# Patient Record
Sex: FEMALE | Race: WHITE | NOT HISPANIC OR LATINO
[De-identification: names, ages, dates, MRNs, and addresses within clinical notes are randomized per-mention and may not be internally consistent; named-entity substitution may affect disease eponyms.]

---

## 2020-08-02 PROBLEM — Z00.00 ENCOUNTER FOR PREVENTIVE HEALTH EXAMINATION: Status: ACTIVE | Noted: 2020-08-02

## 2020-08-02 PROBLEM — R92.8 ABNORMAL FINDING ON BREAST IMAGING: Status: ACTIVE | Noted: 2020-08-02

## 2020-08-03 ENCOUNTER — APPOINTMENT (OUTPATIENT)
Dept: SURGICAL ONCOLOGY | Facility: CLINIC | Age: 40
End: 2020-08-03
Payer: COMMERCIAL

## 2020-08-03 VITALS
RESPIRATION RATE: 16 BRPM | HEIGHT: 66 IN | SYSTOLIC BLOOD PRESSURE: 125 MMHG | DIASTOLIC BLOOD PRESSURE: 78 MMHG | WEIGHT: 140 LBS | OXYGEN SATURATION: 98 % | HEART RATE: 68 BPM | BODY MASS INDEX: 22.5 KG/M2

## 2020-08-03 DIAGNOSIS — R92.8 OTHER ABNORMAL AND INCONCLUSIVE FINDINGS ON DIAGNOSTIC IMAGING OF BREAST: ICD-10-CM

## 2020-08-03 PROCEDURE — 99205 OFFICE O/P NEW HI 60 MIN: CPT

## 2020-08-09 ENCOUNTER — OUTPATIENT (OUTPATIENT)
Dept: OUTPATIENT SERVICES | Facility: HOSPITAL | Age: 40
LOS: 1 days | End: 2020-08-09

## 2020-08-09 DIAGNOSIS — C80.1 MALIGNANT (PRIMARY) NEOPLASM, UNSPECIFIED: ICD-10-CM

## 2020-08-12 ENCOUNTER — RESULT REVIEW (OUTPATIENT)
Age: 40
End: 2020-08-12

## 2020-08-13 ENCOUNTER — RESULT REVIEW (OUTPATIENT)
Age: 40
End: 2020-08-13

## 2020-08-14 ENCOUNTER — APPOINTMENT (OUTPATIENT)
Dept: MAMMOGRAPHY | Facility: IMAGING CENTER | Age: 40
End: 2020-08-14
Payer: MEDICAID

## 2020-08-14 ENCOUNTER — RESULT REVIEW (OUTPATIENT)
Age: 40
End: 2020-08-14

## 2020-08-14 ENCOUNTER — OUTPATIENT (OUTPATIENT)
Dept: OUTPATIENT SERVICES | Facility: HOSPITAL | Age: 40
LOS: 1 days | End: 2020-08-14
Payer: MEDICAID

## 2020-08-14 DIAGNOSIS — Z00.8 ENCOUNTER FOR OTHER GENERAL EXAMINATION: ICD-10-CM

## 2020-08-14 PROCEDURE — 77065 DX MAMMO INCL CAD UNI: CPT

## 2020-08-14 PROCEDURE — G0279: CPT

## 2020-08-14 PROCEDURE — 77065 DX MAMMO INCL CAD UNI: CPT | Mod: 26,RT

## 2020-08-14 PROCEDURE — 77061 BREAST TOMOSYNTHESIS UNI: CPT | Mod: 26

## 2020-08-24 ENCOUNTER — OUTPATIENT (OUTPATIENT)
Dept: OUTPATIENT SERVICES | Facility: HOSPITAL | Age: 40
LOS: 1 days | Discharge: HOME | End: 2020-08-24

## 2020-08-24 DIAGNOSIS — C44.40 UNSPECIFIED MALIGNANT NEOPLASM OF SKIN OF SCALP AND NECK: ICD-10-CM

## 2020-08-28 ENCOUNTER — APPOINTMENT (OUTPATIENT)
Dept: HEMATOLOGY ONCOLOGY | Facility: CLINIC | Age: 40
End: 2020-08-28

## 2020-09-04 ENCOUNTER — APPOINTMENT (OUTPATIENT)
Dept: HEMATOLOGY ONCOLOGY | Facility: CLINIC | Age: 40
End: 2020-09-04
Payer: COMMERCIAL

## 2020-09-04 PROCEDURE — 99499A: CUSTOM | Mod: NC

## 2020-09-11 ENCOUNTER — APPOINTMENT (OUTPATIENT)
Dept: HEMATOLOGY ONCOLOGY | Facility: CLINIC | Age: 40
End: 2020-09-11

## 2021-02-19 ENCOUNTER — OUTPATIENT (OUTPATIENT)
Dept: OUTPATIENT SERVICES | Facility: HOSPITAL | Age: 41
LOS: 1 days | End: 2021-02-19
Payer: MEDICAID

## 2021-02-19 ENCOUNTER — APPOINTMENT (OUTPATIENT)
Dept: MAMMOGRAPHY | Facility: IMAGING CENTER | Age: 41
End: 2021-02-19
Payer: MEDICAID

## 2021-02-19 ENCOUNTER — RESULT REVIEW (OUTPATIENT)
Age: 41
End: 2021-02-19

## 2021-02-19 ENCOUNTER — APPOINTMENT (OUTPATIENT)
Dept: ULTRASOUND IMAGING | Facility: IMAGING CENTER | Age: 41
End: 2021-02-19
Payer: MEDICAID

## 2021-02-19 DIAGNOSIS — Z00.8 ENCOUNTER FOR OTHER GENERAL EXAMINATION: ICD-10-CM

## 2021-02-19 PROCEDURE — 77065 DX MAMMO INCL CAD UNI: CPT | Mod: 26,RT

## 2021-02-19 PROCEDURE — 77061 BREAST TOMOSYNTHESIS UNI: CPT | Mod: 26

## 2021-02-19 PROCEDURE — G0279: CPT

## 2021-02-19 PROCEDURE — 77065 DX MAMMO INCL CAD UNI: CPT

## 2021-11-04 ENCOUNTER — RESULT REVIEW (OUTPATIENT)
Age: 41
End: 2021-11-04

## 2021-11-04 ENCOUNTER — APPOINTMENT (OUTPATIENT)
Dept: MAMMOGRAPHY | Facility: CLINIC | Age: 41
End: 2021-11-04
Payer: MEDICAID

## 2021-11-04 ENCOUNTER — APPOINTMENT (OUTPATIENT)
Dept: ULTRASOUND IMAGING | Facility: CLINIC | Age: 41
End: 2021-11-04
Payer: MEDICAID

## 2021-11-04 PROCEDURE — 77063 BREAST TOMOSYNTHESIS BI: CPT

## 2021-11-04 PROCEDURE — 76641 ULTRASOUND BREAST COMPLETE: CPT | Mod: 50

## 2021-11-04 PROCEDURE — 77067 SCR MAMMO BI INCL CAD: CPT

## 2021-11-08 NOTE — REASON FOR VISIT
[Initial Consultation] : an initial consultation for [Other: _____] : [unfilled] [FreeTextEntry2] : Abnormal breast examination and findings on breast imaging

## 2021-11-08 NOTE — HISTORY OF PRESENT ILLNESS
[de-identified] : 39-year-old lady (self-referred) for breast examination.\par \par Several year history of bilateral mastalgia (R>L).\par No other signs or symptoms related to either breast..\par She has a normal self-examination.\par \par 2020:\par She had a sonogram guided needle biopsy by Dr. Huy Thomas (a pathologist in [place]) of an asymptomatic abnormality in the right breast (12:00) identified on her annual imaging May 2020 at [facility] (below).\par She was referred to Dr. Thomas by her internist (below) because [facility] could not accommodate her in a timely fashion due to the coronavirus pandemic.\par The biopsy was performed under ultrasound guidance, not radiographic; obtain cytology, not histology; and while no malignant cells were identified, neither was any normal breast tissue.\par The results were designated benign and concordant with a six-month follow-up ultrasound recommended.\par The patient was uneasy with the above sequence of events, and comes to us for further management.\par \par Right breast (12:00) FNA biopsy of a 2.6 x 1.2 x 1.5 cm nodule, Cytology demonstrated:\par No malignant cells.\par Fibroadipose tissue.\par \par \par May 2019:\par Baseline bilateral mammogram and sonogram at [facility]: BI-RADS-1.\par Study was performed for a palpable right breast nodule (12:00) which was tender.\par \par May 2020:\par Annual, bilateral mammogram and sonogram at [facility]: BI-RADS 4/3:\par #1. Area of architectural distortion right breast: Stereotactic biopsy recommended.\par #2. Bilateral breast nodules (right 11:00, left 2:00) for which 6 month followup was recommended for 2020\par \par 2020:\par Baseline breast MRI performed at [facility].\par No MRI correlate for the radiographic abnormality.\par Stereotactic right breast biopsy recommended (again): BI-RADS 4\par \par No other personal history of breast disease.\par \par No relatives with breast cancer.\par \par No relatives with ovarian cancer.\par \par Not Ashkenazi.\par \par Menarche at 11.\par  1, para 1 at 35.\par Still has regular periods, LMP 2020.\par No exogenous hormones.\par \par She has had bilateral breast pain, right greater than left since 2016.\par There is a cyclical component to her symptoms, but they never completely subside.\par Several caffeinated beverages daily.\par Non-smoker.\par No exogenous hormones.\par \par \par PMD: Dr. Ondina GARCIA\par \par No pacemaker or defibrillator.\par No anticoagulants.\par \par No prescription medications\par \par \par GYN: [place]. [place] May 2020 exam was normal.\par May 2020 pelvic sonogram was also normal.\par

## 2021-11-08 NOTE — PHYSICAL EXAM
[Normal] : supple, no neck mass and thyroid not enlarged [Normal Neck Lymph Nodes] : normal neck lymph nodes  [Normal Supraclavicular Lymph Nodes] : normal supraclavicular lymph nodes [Normal Axillary Lymph Nodes] : normal axillary lymph nodes [Normal] : normal appearance, no rash, nodules, vesicles, ulcers, erythema [de-identified] : Groins not examined

## 2021-11-08 NOTE — ASSESSMENT
[FreeTextEntry1] : Her physical examination is normal today.\par \par Her breast pain is likely cyclical mastalgia from fibrocystic changes.\par \par Summary of breast imaging:\par May 2020 annual bilateral mammogram and sonogram at [facility] BI-RADS 4/3.\par July 2020 sonogram guided needle biopsy by Dr. Huy Thomas -benign (?  [place]).\par July 2020 baseline breast MRI at [facility], no correlate for radiographic abnormality, stereotactic biopsy re-recommended.\par \par Regarding the imaging findings, I have retained her studies from [facility] and submitted them for internal review, and a stereotactic right breast biopsy (12:00).\par Prescription entered.\par Irrespective, she will need bilateral follow-up breast ultrasounds of equivocal findings from June 2020 i.e. December 2020,,,,,,,,,,,,,,,,,,,,,,,,,,,,\par \par Further management based upon those results.\par \par Reviewed in detail, all questions answered.\par \par Presently, no note dictated since not referred by another physician.\par \par \par 08-06-20.\par We spoke.\par 1.  Still awaiting a review of her outside imaging, by our breast radiologist's.\par Likely needs a stereotactic right breast biopsy........................ \par 2.  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 Gissell Trejo in [place].\par \par \par 2/19/2021:\par Diagnostic right mammogram at 450: BI-RADS 1.\par \par \par 9/30/2021:\par She called requesting entry of prescriptions for her annual breast imaging.\par Submitted this evening.\par \par \par 11/4/2021:\par Bilateral mammogram and sonogram at [facility]: BI-RADS 0.\par Left breast ultrasound (2:00) recommended.\par Prescription entered 11/8/2021

## 2021-11-10 ENCOUNTER — RESULT REVIEW (OUTPATIENT)
Age: 41
End: 2021-11-10

## 2021-11-10 ENCOUNTER — OUTPATIENT (OUTPATIENT)
Dept: OUTPATIENT SERVICES | Facility: HOSPITAL | Age: 41
LOS: 1 days | End: 2021-11-10
Payer: MEDICAID

## 2021-11-10 ENCOUNTER — APPOINTMENT (OUTPATIENT)
Dept: ULTRASOUND IMAGING | Facility: IMAGING CENTER | Age: 41
End: 2021-11-10
Payer: MEDICAID

## 2021-11-10 DIAGNOSIS — R92.8 OTHER ABNORMAL AND INCONCLUSIVE FINDINGS ON DIAGNOSTIC IMAGING OF BREAST: ICD-10-CM

## 2021-11-10 PROCEDURE — 76642 ULTRASOUND BREAST LIMITED: CPT | Mod: 26,LT

## 2021-11-10 PROCEDURE — 76642 ULTRASOUND BREAST LIMITED: CPT

## 2023-06-20 ENCOUNTER — OUTPATIENT (OUTPATIENT)
Dept: OUTPATIENT SERVICES | Facility: HOSPITAL | Age: 43
LOS: 1 days | End: 2023-06-20

## 2023-06-20 ENCOUNTER — APPOINTMENT (OUTPATIENT)
Dept: ULTRASOUND IMAGING | Facility: CLINIC | Age: 43
End: 2023-06-20
Payer: MEDICAID

## 2023-06-20 ENCOUNTER — APPOINTMENT (OUTPATIENT)
Dept: MAMMOGRAPHY | Facility: CLINIC | Age: 43
End: 2023-06-20
Payer: MEDICAID

## 2023-06-20 PROCEDURE — 76641 ULTRASOUND BREAST COMPLETE: CPT | Mod: 26,50

## 2023-06-20 PROCEDURE — 77067 SCR MAMMO BI INCL CAD: CPT | Mod: 26

## 2023-06-20 PROCEDURE — 77063 BREAST TOMOSYNTHESIS BI: CPT | Mod: 26

## 2024-09-26 ENCOUNTER — APPOINTMENT (OUTPATIENT)
Dept: ULTRASOUND IMAGING | Facility: CLINIC | Age: 44
End: 2024-09-26
Payer: MEDICAID

## 2024-09-26 ENCOUNTER — OUTPATIENT (OUTPATIENT)
Dept: OUTPATIENT SERVICES | Facility: HOSPITAL | Age: 44
LOS: 1 days | End: 2024-09-26

## 2024-09-26 ENCOUNTER — APPOINTMENT (OUTPATIENT)
Dept: MAMMOGRAPHY | Facility: CLINIC | Age: 44
End: 2024-09-26
Payer: MEDICAID

## 2024-09-26 PROCEDURE — 77063 BREAST TOMOSYNTHESIS BI: CPT | Mod: 26

## 2024-09-26 PROCEDURE — 77067 SCR MAMMO BI INCL CAD: CPT | Mod: 26

## 2024-09-26 PROCEDURE — 76641 ULTRASOUND BREAST COMPLETE: CPT | Mod: 26,50

## 2024-09-30 ENCOUNTER — RESULT REVIEW (OUTPATIENT)
Age: 44
End: 2024-09-30

## 2024-09-30 ENCOUNTER — APPOINTMENT (OUTPATIENT)
Dept: ULTRASOUND IMAGING | Facility: CLINIC | Age: 44
End: 2024-09-30
Payer: MEDICAID

## 2024-09-30 PROCEDURE — 19083 BX BREAST 1ST LESION US IMAG: CPT | Mod: LT

## 2024-09-30 PROCEDURE — A4648: CPT

## 2024-09-30 PROCEDURE — 77065 DX MAMMO INCL CAD UNI: CPT | Mod: LT

## 2024-10-02 PROBLEM — C50.512 MALIGNANT NEOPLASM OF LOWER-OUTER QUADRANT OF LEFT FEMALE BREAST, UNSPECIFIED ESTROGEN RECEPTOR STATUS: Status: ACTIVE | Noted: 2024-10-02

## 2024-10-15 ENCOUNTER — TRANSCRIPTION ENCOUNTER (OUTPATIENT)
Age: 44
End: 2024-10-15

## 2024-10-15 ENCOUNTER — APPOINTMENT (OUTPATIENT)
Dept: NUCLEAR MEDICINE | Facility: IMAGING CENTER | Age: 44
End: 2024-10-15

## 2025-01-31 ENCOUNTER — APPOINTMENT (OUTPATIENT)
Dept: OTOLARYNGOLOGY | Facility: CLINIC | Age: 45
End: 2025-01-31

## 2025-02-07 ENCOUNTER — APPOINTMENT (OUTPATIENT)
Dept: OTOLARYNGOLOGY | Facility: CLINIC | Age: 45
End: 2025-02-07